# Patient Record
Sex: MALE | Race: WHITE | URBAN - METROPOLITAN AREA
[De-identification: names, ages, dates, MRNs, and addresses within clinical notes are randomized per-mention and may not be internally consistent; named-entity substitution may affect disease eponyms.]

---

## 2020-07-06 ENCOUNTER — OFFICE VISIT CONVERTED (OUTPATIENT)
Dept: SURGERY | Facility: CLINIC | Age: 45
End: 2020-07-06
Attending: SURGERY

## 2020-07-09 ENCOUNTER — HOSPITAL ENCOUNTER (OUTPATIENT)
Dept: PREADMISSION TESTING | Facility: HOSPITAL | Age: 45
Discharge: HOME | End: 2020-07-09
Attending: SURGERY

## 2020-07-10 LAB — SARS-COV-2 RNA SPEC QL NAA+PROBE: NOT DETECTED

## 2020-07-13 ENCOUNTER — HOSPITAL ENCOUNTER (OUTPATIENT)
Dept: PERIOP | Facility: HOSPITAL | Age: 45
Setting detail: HOSPITAL OUTPATIENT SURGERY
Discharge: HOME | End: 2020-07-13
Attending: SURGERY

## 2020-07-23 ENCOUNTER — OFFICE VISIT CONVERTED (OUTPATIENT)
Dept: SURGERY | Facility: CLINIC | Age: 45
End: 2020-07-23
Attending: SURGERY

## 2020-07-23 ENCOUNTER — CONVERSION ENCOUNTER (OUTPATIENT)
Dept: SURGERY | Facility: CLINIC | Age: 45
End: 2020-07-23

## 2021-05-10 NOTE — H&P
History and Physical      Patient Name: Branden Bernal   Patient ID: [id number]   Sex: Male   YOB: 1975        Visit Date: July 6, 2020    Provider: Landen Winters MD   [facility]   Location Address: [address] [place] [address]  [id number]   Location Phone: [phone]          Chief Complaint  · Outpatient History & Physical / Surgical Orders  · Hemorrhoids  · ANAL FISSURE  · RECTAL PAIN      History Of Present Illness     Mr. Bernal is a 44-year-old male who presents with rectal pain and rectal bleeding. He notes pain with bowel movements.       Past Medical History  GERD; Hypertension; Mood disorder         Past Surgical History  Tonsilectomy         Medication List  baclofen 10 mg oral tablet; divalproex 250 mg oral tablet extended release 24 hr; gabapentin 600 mg oral tablet; ibuprofen 800 mg oral tablet; risperidone 0.5 mg oral tablet; Tylenol 325 mg oral tablet         Allergy List  NO KNOWN DRUG ALLERGIES         Family Medical History  *No Known Family History         Social History  Tobacco (Current every day)         Review of Systems  · Cardiovascular  o Denies  o : chest pain on exertion, shortness of breath, lower extremity swelling  · Respiratory  o Denies  o : wheezing, chronic cough, coughing up blood  · Gastrointestinal  o Denies  o : diarrhea, chronic abdominal pain, reflux symptoms      Physical Examination  · Constitutional  o Appearance  o : reveals patient to be in no acute distress  · Head and Face  o HEENT  o : shows sclera to be nonicteric  · Respiratory  o Respiratory  o : chest is clear  · Cardiovascular  o Heart  o : regular rate and rhythm without murmurs, gallops or rubs  · Gastrointestinal  o Abdominal Examination  o :   § Abdomen  § : abdomen is soft and nontender, bowel sounds are present, no masses, gaurding or rebound were noted   · Musculoskeletal  o Extremeties/Joint  o : extremities show a ful range of  motion  · Neurologic  o Neurologic/Reflexes  o : intact          Assessment  · Hemorrhoids     455.6/K64.9  · Anal fissure     565.0/K60.2  · Rectal pain     569.42/K62.89  · Pre-op testing     V72.84/Z01.818      Plan  · Orders  o Surgery Order (GENOR) - 455.6/K64.9 - 07/13/2020  o [facility] Pre-Op Covid-19 Screening (01851) - V72.84/Z01.818 - 07/09/2020 7/9/20 @2:30PM. [facility].  · Medications  o Medications have been Reconciled  o Transition of Care or Provider Policy  · Instructions  o PLAN:  o Handouts Provided-Pre-Procedure Instructions including date and time and location of procedure.  o ****Surgical Orders****  o RISK AND BENEFITS:  o Consent for surgery: Given these options, the patient has verbally expressed an understanding of the risks of surgery and finds these risks acceptable. We will proceed with surgery as soon as possible.  o Consult Anesthesia for any post operative block, or any pain management procedure deemed necessary by the anesthesiologist for adequate post-operative pain control.  o O.R. PREP: Per protocol  o IV: LR@ 75ml/hr  o PLEASE SIGN PERMIT FOR:RECTAL EXAM UNDER ANESTHESIA, SIGMOIDOSCOPY,POSSIBLE HEMORROIDECTOMY, EXCISION OF ANAL FISSURE  o Kefzol 1 gram IV on call to OR.  o The above History and Physical Examination has been completed within 30 days of admission.  o ****Patient Status****  o Outpatient  o Pre-Admission Testing Date: 7/9/20 @8AM  · Referrals  o ID: [id number] Date: 07/02/2020 Type: Inbound  Specialty: General Surgery            Electronically Signed by: Ngozi Dos Santos-, -Author on July 10, 2020 10:03:29 AM  Electronically Co-signed by: Landen Winters MD -Reviewer on July 13, 2020 01:03:22 PM

## 2021-05-15 VITALS — RESPIRATION RATE: 15 BRPM | WEIGHT: 198 LBS | BODY MASS INDEX: 29.33 KG/M2 | HEIGHT: 69 IN

## 2025-02-27 ENCOUNTER — OFFICE VISIT (OUTPATIENT)
Dept: ORTHOPEDIC SURGERY | Facility: CLINIC | Age: 50
End: 2025-02-27
Payer: OTHER GOVERNMENT

## 2025-02-27 VITALS
BODY MASS INDEX: 33.46 KG/M2 | OXYGEN SATURATION: 95 % | DIASTOLIC BLOOD PRESSURE: 72 MMHG | WEIGHT: 220.8 LBS | HEIGHT: 68 IN | SYSTOLIC BLOOD PRESSURE: 107 MMHG | HEART RATE: 73 BPM

## 2025-02-27 DIAGNOSIS — M19.011 ARTHRITIS OF RIGHT ACROMIOCLAVICULAR JOINT: ICD-10-CM

## 2025-02-27 DIAGNOSIS — M25.511 RIGHT SHOULDER PAIN, UNSPECIFIED CHRONICITY: Primary | ICD-10-CM

## 2025-02-27 RX ORDER — TRIAMCINOLONE ACETONIDE 40 MG/ML
40 INJECTION, SUSPENSION INTRA-ARTICULAR; INTRAMUSCULAR
Status: COMPLETED | OUTPATIENT
Start: 2025-02-27 | End: 2025-02-27

## 2025-02-27 RX ORDER — LIDOCAINE HYDROCHLORIDE 10 MG/ML
5 INJECTION, SOLUTION INFILTRATION; PERINEURAL
Status: COMPLETED | OUTPATIENT
Start: 2025-02-27 | End: 2025-02-27

## 2025-02-27 RX ORDER — DICLOFENAC SODIUM 75 MG/1
75 TABLET, DELAYED RELEASE ORAL 2 TIMES DAILY
Qty: 60 TABLET | Refills: 1 | Status: SHIPPED | OUTPATIENT
Start: 2025-02-27

## 2025-02-27 RX ORDER — IBUPROFEN 800 MG/1
TABLET, FILM COATED ORAL
COMMUNITY

## 2025-02-27 RX ORDER — ACETAMINOPHEN 325 MG/1
TABLET ORAL
COMMUNITY

## 2025-02-27 RX ADMIN — TRIAMCINOLONE ACETONIDE 40 MG: 40 INJECTION, SUSPENSION INTRA-ARTICULAR; INTRAMUSCULAR at 09:03

## 2025-02-27 RX ADMIN — LIDOCAINE HYDROCHLORIDE 5 ML: 10 INJECTION, SOLUTION INFILTRATION; PERINEURAL at 09:03

## 2025-02-27 NOTE — PROGRESS NOTES
"Chief Complaint  Initial Evaluation of the Right Shoulder     Subjective      Branden Bernal presents to [facility] for initial evaluation of the right shoulder.  He has had pain for about 7 months.  He works and does software development.  That is the only thing that he can think of that caused his problems.  He has had no injury or fall.  He has been to chiropractor and that helped the pain a lot.  Now he has pain with forward and upward ROM of the shoulder.  He takes ibuprofen and tylenol morning and night. His pain is mostly in the mid forearm and mid upper arm.       No Known Allergies     Social History     Socioeconomic History    Marital status:    Tobacco Use    Smoking status: Every Day     Types: Cigarettes     Passive exposure: Current    Smokeless tobacco: Never   Vaping Use    Vaping status: Never Used   Substance and Sexual Activity    Alcohol use: Not Currently    Drug use: Defer    Sexual activity: Defer        I reviewed the patient's chief complaint, history of present illness, review of systems, past medical history, surgical history, family history, social history, medications, and allergy list.     Review of Systems     Constitutional: Denies fevers, chills, weight loss  Cardiovascular: Denies chest pain, shortness of breath  Skin: Denies rashes, acute skin changes  Neurologic: Denies headache, loss of consciousness  M      Vital Signs:   /72   Pulse 73   Ht 172.7 cm (68\")   Wt 100 kg (220 lb 12.8 oz)   SpO2 95%   BMI 33.57 kg/m²          Physical Exam  General: Alert. No acute distress    Ortho Exam        RIGHT SHOULDER Full ROM of the shoulder.  Positive Cross body adduction. Supraspinatus strength 4+/5. Infraspinatus Strength 5/5. Infrared subscap 5/5. Positive Burns. Positive Neer. Negative Apprehension. Negative Lift off. (Negative Obriens. Sensation intact to light touch, median, radial, ulnar nerve. Positive AIN, PIN, ulnar nerve motor. " Positive pulses. Positive Impingement signs. Good strength in triceps, biceps, deltoid, wrist extensors and wrist flexors. Tender to palpation to the anterior aspect of the shoulder and down the arm.         Large Joint Arthrocentesis: R subacromial bursa  Date/Time: 2/27/2025 9:03 AM  Consent given by: patient  Site marked: site marked  Timeout: Immediately prior to procedure a time out was called to verify the correct patient, procedure, equipment, support staff and site/side marked as required   Supporting Documentation  Indications: pain   Procedure Details  Location: shoulder - R subacromial bursa  Preparation: Patient was prepped and draped in the usual sterile fashion  Needle gauge: 21 G.  Medications administered: 5 mL lidocaine 1 %; 40 mg triamcinolone acetonide 40 MG/ML  Patient tolerance: patient tolerated the procedure well with no immediate complications    This injection documentation was Scribed for Diana Toussaint MD by Mary Hernandez.  02/27/25   09:04 EST      Imaging Results (Most Recent)       Procedure Component Value Units Date/Time    XR Scapula Right [[id number]] Resulted: 02/27/25 0833     Updated: 02/27/25 0838             Result Review :     X-Ray Report:  Right scapula X-Ray  Indication: Evaluation of the right scapula  AP/Lateral view(s)  Findings: Mild to moderate AC joint arthritis.    Prior studies available for comparison: no             Assessment and Plan     Diagnoses and all orders for this visit:    1. Right shoulder pain, unspecified chronicity (Primary)  -     XR Scapula Right    2. Arthritis of right acromioclavicular joint        Discussed the treatment plan with the patient. I reviewed the X-rays that were obtained today with the patient.     Discussed the risks and benefits of conservative measures. The patient expressed understanding and wished to proceed with a right shoulder steroid injection.  He tolerated the injection well.     Discussed with the patient that  due to the steroid injection given today in the office they may see an increase in blood sugar for a few days. Advised patient to monitor sugar after receiving the injection.     Discussed possibility of a reaction from the injection.  Discussed the possibility that the injection may not completely improve or remove the pain.  Discussed the risk of infection.    Prescribed anti inflammatory.  HEP exercises given.        Educated on risk of smoking/nicotine. Discussed options for smoking cessation regarding chantix, nicorette gum and/ or to call the quit hotline at [phone]  and Call or return if worsening symptoms.    Follow Up     4-6 weeks to assess if injection was helpful.          Patient was given instructions and counseling regarding his condition or for health maintenance advice. Please see specific information pulled into the AVS if appropriate.     Scribed for Diana Toussaint MD by Cha Moseley MA.  02/27/25   08:34 EST    I have personally performed the services described in this document as scribed by the above individual and it is both accurate and complete. Diana Toussaint MD 02/27/25

## 2025-04-10 ENCOUNTER — OFFICE VISIT (OUTPATIENT)
Dept: ORTHOPEDIC SURGERY | Facility: CLINIC | Age: 50
End: 2025-04-10
Payer: OTHER GOVERNMENT

## 2025-04-10 VITALS
DIASTOLIC BLOOD PRESSURE: 74 MMHG | WEIGHT: 220.46 LBS | BODY MASS INDEX: 33.41 KG/M2 | SYSTOLIC BLOOD PRESSURE: 109 MMHG | OXYGEN SATURATION: 97 % | HEIGHT: 68 IN | HEART RATE: 91 BPM

## 2025-04-10 DIAGNOSIS — M19.011 ARTHRITIS OF RIGHT ACROMIOCLAVICULAR JOINT: ICD-10-CM

## 2025-04-10 DIAGNOSIS — M25.511 RIGHT SHOULDER PAIN, UNSPECIFIED CHRONICITY: Primary | ICD-10-CM

## 2025-04-10 PROCEDURE — 99213 OFFICE O/P EST LOW 20 MIN: CPT

## 2025-04-10 RX ORDER — BACLOFEN 10 MG/1
TABLET ORAL
COMMUNITY

## 2025-04-10 RX ORDER — DIVALPROEX SODIUM 250 MG/1
10 TABLET, FILM COATED, EXTENDED RELEASE ORAL
COMMUNITY

## 2025-04-10 RX ORDER — METHYLPREDNISOLONE 4 MG/1
TABLET ORAL
Qty: 21 TABLET | Refills: 0 | Status: SHIPPED | OUTPATIENT
Start: 2025-04-10

## 2025-04-10 NOTE — PROGRESS NOTES
"Chief Complaint  Pain and Follow-up of the Right Shoulder    Subjective      Branden Bernal presents to [facility]     History of Present Illness  He is here today following up on his right shoulder.  He was last seen in office by Dr. Toussaint on 2/27/2025 and received a right shoulder steroid injection and a prescription for diclofenac.  He has had pain for about 9 months now.  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     He reports a significant reduction in shoulder pain following an injection administered on 02/27/2025. Initially, he experienced a 40 to 50 percent improvement, but currently, the pain has escalated to its previous intensity, with only a 10 percent relief. He describes the pain as a severe, burning sensation that radiates up his neck, rendering him unable to type or use his shoulder. He likens the pain to a white-hot burn.      He has been sleeping on a flat couch for several years, which he believes may be contributing to his discomfort. He has found some relief from chiropractic adjustments, which have enabled him to resume driving. He has been managing his pain with diclofenac and Tylenol, which he reports as being effective.    He states [facility] with Dr. Toussaint in February.  He states he thinks he has a copy of it at home.  He states that due to his pain today he is requesting a repeat steroid injection if possible    MEDICATIONS  Diclofenac, Tylenol      No Known Allergies    Objective     Vital Signs:   Vitals:    04/10/25 0803   BP: 109/74   Pulse: 91   SpO2: 97%   Weight: 100 kg (220 lb 7.4 oz)   Height: 172.7 cm (68\")     Body mass index is 33.52 kg/m².    I reviewed the patient's chief complaint, history of present illness, review of systems, past medical history, surgical history, family history, social history, medications, and allergy list. "     Ortho Exam  Shoulder   General: Alert. No acute distress.  Right upper Extremity: tender to palpation over trapezius muscle. No skin discoloration, atrophy, or swelling. 170 degrees active elevation. External rotation to 45 degrees. Internal rotation to side pocket.   Demonstrates intact active elbow ROM. Demonstrates intact active wrist ROM. Sensation intact. Palpable radial pulse. Neurovascularly intact.            XR Scapula Right  X-Ray Report:  Right scapula X-Ray  Indication: Evaluation of the right scapula  AP/Lateral view(s)  Findings: Mild to moderate AC joint arthritis.    Prior studies available for comparison: no        Results           Assessment and Plan   Diagnoses and all orders for this visit:    1. Right shoulder pain, unspecified chronicity (Primary)  -     methylPREDNISolone (MEDROL) 4 MG dose pack; Take as directed on package instructions.  Dispense: 21 tablet; Refill: 0    2. Arthritis of right acromioclavicular joint  -     methylPREDNISolone (MEDROL) 4 MG dose pack; Take as directed on package instructions.  Dispense: 21 tablet; Refill: 0              Assessment & Plan  1. Shoulder pain.  The patient reports significant improvement in shoulder pain following an injection on 02/27/2025, with initial relief of 40-50% and current relief at about 10%. He describes the pain as a white-hot burning sensation that radiates up his neck, making it difficult to type or use his shoulder. He has been sleeping on a flat couch for years, which may contribute to the pain. He is currently taking diclofenac and Tylenol, which provide some relief. He has also seen a chiropractor, which helped alleviate some symptoms. A prescription for a steroid pack will be sent to [facility] to manage inflammation and provide relief during his upcoming hike. He is advised to bring the MRI results to the next appointment for further evaluation. The side effects of steroid pills, including temporary increases in blood  pressure, blood sugar, and heart rate, were discussed.    PROCEDURE  The patient received a shoulder injection on 02/27/2025, which initially provided 40-50% relief.      Follow up in about 7 weeks when he is eligible for repeat steroid injection.  Will [facility] at that [facility].        Tobacco Use: High Risk (4/10/2025)    Patient History     Smoking Tobacco Use: Every Day     Smokeless Tobacco Use: Never     Passive Exposure: Current     Patient reports tobacco use. Tobacco use can delay healing and complicate the recovery process. Recommended tobacco cessation for optimal healing and health benefits.           Follow Up   Return in about 7 weeks (around 5/29/2025).  There are no Patient Instructions on file for this visit.    Patient was given instructions and counseling regarding his condition or for health maintenance advice. Please see specific information pulled into the AVS if appropriate.     Patient or patient representative verbalized consent for the use of Ambient Listening during the visit with  MARGIE Moser for chart documentation. 4/10/2025  08:56 EDT    Dictated Utilizing Dragon Dictation. Please note that portions of this note were completed with a voice recognition program. Part of this note may be an electronic transcription/translation of spoken language to printed text using the Dragon Dictation System.

## 2025-05-29 ENCOUNTER — OFFICE VISIT (OUTPATIENT)
Dept: ORTHOPEDIC SURGERY | Facility: CLINIC | Age: 50
End: 2025-05-29
Payer: OTHER GOVERNMENT

## 2025-05-29 VITALS — OXYGEN SATURATION: 98 % | HEART RATE: 81 BPM | DIASTOLIC BLOOD PRESSURE: 91 MMHG | SYSTOLIC BLOOD PRESSURE: 149 MMHG

## 2025-05-29 DIAGNOSIS — M19.011 ARTHRITIS OF RIGHT ACROMIOCLAVICULAR JOINT: ICD-10-CM

## 2025-05-29 DIAGNOSIS — M75.41 IMPINGEMENT SYNDROME OF RIGHT SHOULDER: Primary | ICD-10-CM

## 2025-05-29 RX ORDER — GABAPENTIN 600 MG/1
600 TABLET ORAL 3 TIMES DAILY
COMMUNITY

## 2025-05-29 RX ORDER — LIDOCAINE HYDROCHLORIDE 10 MG/ML
5 INJECTION, SOLUTION INFILTRATION; PERINEURAL
Status: COMPLETED | OUTPATIENT
Start: 2025-05-29 | End: 2025-05-29

## 2025-05-29 RX ORDER — TRIAMCINOLONE ACETONIDE 40 MG/ML
40 INJECTION, SUSPENSION INTRA-ARTICULAR; INTRAMUSCULAR
Status: COMPLETED | OUTPATIENT
Start: 2025-05-29 | End: 2025-05-29

## 2025-05-29 RX ADMIN — LIDOCAINE HYDROCHLORIDE 5 ML: 10 INJECTION, SOLUTION INFILTRATION; PERINEURAL at 08:11

## 2025-05-29 RX ADMIN — TRIAMCINOLONE ACETONIDE 40 MG: 40 INJECTION, SUSPENSION INTRA-ARTICULAR; INTRAMUSCULAR at 08:11

## 2025-05-29 NOTE — PROGRESS NOTES
Chief Complaint  Follow-up of the Right Shoulder    Subjective      Branden Bernal presents to [facility]     History of Present Illness  The patient is here today for a follow-up on his right shoulder. He was last seen in the office on 04/10/2025 and given a Medrol Dosepak. He has received a right shoulder steroid injection in the past on 02/27/2025 and a prescription for diclofenac. 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 The injection helped significantly with his pain, and he is eligible for that today.    He reports a fluctuating pattern of pain in his right shoulder, which he perceives as slightly improved since his last visit. He recently embarked on a week-long hike with his son, during which he used a new type of backpack that promotes better posture. This change in posture appears to have alleviated his shoulder pain. He also mentions that he has not visited the chiropractor in a month. He completed the course of Medrol Dosepak prior to the hike. He recalls a period of several months when he had to maintain a downward head position due to the pain. Chiropractic adjustments provided immediate relief, but he did not observe any significant healing until after the administration of the steroid injection. The injection initially eliminated the pain, but it has since returned gradually.    He acknowledges poor sleep habits, including sleeping on each side, which contribute to his discomfort upon waking. He rates his current pain level as 2 or 3 out of 10. He experiences a sensation of tightness upon raising his arm and reports numbness in half of his thumb, which has been present for years but has recently worsened.      No Known Allergies    Objective     Vital Signs:   Vitals:    05/29/25 0750   BP: 149/91   Pulse: 81   SpO2: 98%   PainSc: 3      There is no height or weight on  file to calculate BMI.    I reviewed the patient's chief complaint, history of present illness, review of systems, past medical history, surgical history, family history, social history, medications, and allergy list.     Ortho Exam    General: Alert. No acute distress.  Right Upper Extremity:  tender to palpation over humeral head. No skin discoloration, atrophy, or swelling. 180 degrees active elevation. External rotation to 45 degrees. Internal rotation to mid thoracic. Demonstrates intact active elbow ROM. Demonstrates intact active wrist ROM. Sensation intact. Palpable radial pulse. Neurovascularly intact.        Large Joint Arthrocentesis  Date/Time: 5/29/2025 8:11 AM  Consent given by: patient  Site marked: site marked  Timeout: Immediately prior to procedure a time out was called to verify the correct patient, procedure, equipment, support staff and site/side marked as required   Supporting Documentation  Indications: pain   Procedure Details  Location: -   Location: right shoulder.Needle gauge: 21 G.  Medications administered: 5 mL lidocaine 1 %; 40 mg triamcinolone acetonide 40 MG/ML  Patient tolerance: patient tolerated the procedure well with no immediate complications       This injection documentation was Scribed for MARGIE Moser by Melissa Garcia MA.  05/29/25   08:11 EDT      Imaging Results (Most Recent)       None                Assessment and Plan   Diagnoses and all orders for this visit:    1. Impingement syndrome of right shoulder (Primary)    2. Arthritis of right acromioclavicular joint    Other orders  -     Large Joint Arthrocentesis       Assessment & Plan  1. Right shoulder pain:    Patient's right shoulder pain is stable and he rates it a 2 out of 10 today.  He has been doing his home exercises and stretching.  He is eligible for repeat right shoulder steroid injection today and elects to proceed. Patient is previously aware of the risks, benefits and alternatives of  injections. Patient was informed of possible adverse effects including but not limited to bleeding, damage to nerve, tendon or artery, increased blood sugar and increased blood pressure. Discussed possibility of a reaction from the injection.  Discussed the possibility that the injection may not completely improve or remove the pain.  Discussed the risk of infection.  Discussed the possibility of worsening pain after the injection.  Informed consent obtained.  Time out was performed. Chlorhexidine was swabbed at injection site per typical technique. Needle injected into bursa, aspiration was performed and then right shoulder steroid slowly injected into joint space, fluid was free flowing. Needle was removed, band-aid placed to injection site.  Patient tolerated injection well with no complications. Additional information was provided on at checkout.     Continue home exercises and stretching.  Could consider formal physical therapy however patient is comfortable maintaining his home exercise program.  [facility] but encourage patient to obtain that report if symptoms worsen for us to have further appropriate treatment plan options.    PROCEDURE  Right shoulder steroid injection was administered today.      Follow up as needed.  Eligible for repeat right shoulder steroid injection in 3 months      Follow Up   Return in about 3 months (around 8/29/2025), or if symptoms worsen or fail to improve.  There are no Patient Instructions on file for this visit.    Patient was given instructions and counseling regarding his condition or for health maintenance advice. Please see specific information pulled into the AVS if appropriate.     Patient or patient representative verbalized consent for the use of Ambient Listening during the visit with  MARGIE Moser for chart documentation. 5/29/2025  08:48 EDT    Dictated Utilizing Dragon Dictation. Please note that portions of this  note were completed with a voice recognition program. Part of this note may be an electronic transcription/translation of spoken language to printed text using the Dragon Dictation System.

## 2025-08-28 ENCOUNTER — OFFICE VISIT (OUTPATIENT)
Dept: ORTHOPEDIC SURGERY | Facility: CLINIC | Age: 50
End: 2025-08-28
Payer: OTHER GOVERNMENT

## 2025-08-28 VITALS
SYSTOLIC BLOOD PRESSURE: 155 MMHG | OXYGEN SATURATION: 98 % | DIASTOLIC BLOOD PRESSURE: 83 MMHG | HEIGHT: 68 IN | HEART RATE: 86 BPM | WEIGHT: 220 LBS | BODY MASS INDEX: 33.34 KG/M2

## 2025-08-28 DIAGNOSIS — M19.011 ARTHRITIS OF RIGHT ACROMIOCLAVICULAR JOINT: ICD-10-CM

## 2025-08-28 DIAGNOSIS — M75.41 IMPINGEMENT SYNDROME OF RIGHT SHOULDER: Primary | ICD-10-CM

## 2025-08-28 RX ORDER — TRIAMCINOLONE ACETONIDE 40 MG/ML
40 INJECTION, SUSPENSION INTRA-ARTICULAR; INTRAMUSCULAR
Status: COMPLETED | OUTPATIENT
Start: 2025-08-28 | End: 2025-08-28

## 2025-08-28 RX ORDER — LIDOCAINE HYDROCHLORIDE 10 MG/ML
5 INJECTION, SOLUTION INFILTRATION; PERINEURAL
Status: COMPLETED | OUTPATIENT
Start: 2025-08-28 | End: 2025-08-28

## 2025-08-28 RX ADMIN — TRIAMCINOLONE ACETONIDE 40 MG: 40 INJECTION, SUSPENSION INTRA-ARTICULAR; INTRAMUSCULAR at 08:13

## 2025-08-28 RX ADMIN — LIDOCAINE HYDROCHLORIDE 5 ML: 10 INJECTION, SOLUTION INFILTRATION; PERINEURAL at 08:13
